# Patient Record
Sex: FEMALE | Race: WHITE
[De-identification: names, ages, dates, MRNs, and addresses within clinical notes are randomized per-mention and may not be internally consistent; named-entity substitution may affect disease eponyms.]

---

## 2019-04-04 ENCOUNTER — HOSPITAL ENCOUNTER (OUTPATIENT)
Dept: HOSPITAL 92 - SCSRAD | Age: 24
Discharge: HOME | End: 2019-04-04
Attending: NURSE PRACTITIONER
Payer: COMMERCIAL

## 2019-04-04 DIAGNOSIS — J40: Primary | ICD-10-CM

## 2019-04-04 PROCEDURE — 71046 X-RAY EXAM CHEST 2 VIEWS: CPT

## 2019-04-04 NOTE — RAD
TWO VIEW CHEST:

 

History: Bronchitis. 

 

FINDINGS: 

The lungs appear clear with no infiltrate identified. Heart and mediastinum unremarkable. Osseous str
uctures unremarkable. 

 

IMPRESSION:

Unremarkable chest. 

 

POS: [facility]

## 2020-01-03 ENCOUNTER — HOSPITAL ENCOUNTER (EMERGENCY)
Dept: HOSPITAL 92 - ERS | Age: 25
Discharge: HOME | End: 2020-01-03
Payer: COMMERCIAL

## 2020-01-03 DIAGNOSIS — Z87.442: ICD-10-CM

## 2020-01-03 DIAGNOSIS — N20.2: Primary | ICD-10-CM

## 2020-01-03 DIAGNOSIS — F17.210: ICD-10-CM

## 2020-01-03 LAB
ALBUMIN SERPL BCG-MCNC: 4.1 G/DL (ref 3.5–5)
ALP SERPL-CCNC: 103 U/L (ref 40–110)
ALT SERPL W P-5'-P-CCNC: 38 U/L (ref 8–55)
ANION GAP SERPL CALC-SCNC: 15 MMOL/L (ref 10–20)
AST SERPL-CCNC: 25 U/L (ref 5–34)
BASOPHILS # BLD AUTO: 0.1 THOU/UL (ref 0–0.2)
BASOPHILS NFR BLD AUTO: 0.4 % (ref 0–1)
BILIRUB SERPL-MCNC: 0.3 MG/DL (ref 0.2–1.2)
BUN SERPL-MCNC: 11 MG/DL (ref 7–18.7)
CALCIUM SERPL-MCNC: 9.9 MG/DL (ref 7.8–10.44)
CHLORIDE SERPL-SCNC: 102 MMOL/L (ref 98–107)
CO2 SERPL-SCNC: 21 MMOL/L (ref 22–29)
CREAT CL PREDICTED SERPL C-G-VRATE: 0 ML/MIN (ref 70–130)
EOSINOPHIL # BLD AUTO: 0.2 THOU/UL (ref 0–0.7)
EOSINOPHIL NFR BLD AUTO: 1.1 % (ref 0–10)
GLOBULIN SER CALC-MCNC: 4 G/DL (ref 2.4–3.5)
GLUCOSE SERPL-MCNC: 112 MG/DL (ref 70–105)
HGB BLD-MCNC: 14.2 G/DL (ref 12–16)
LIPASE SERPL-CCNC: 13 U/L (ref 8–78)
LYMPHOCYTES # BLD: 2.7 THOU/UL (ref 1.2–3.4)
LYMPHOCYTES NFR BLD AUTO: 15.6 % (ref 21–51)
MCH RBC QN AUTO: 26.9 PG (ref 27–31)
MCV RBC AUTO: 82.8 FL (ref 78–98)
MONOCYTES # BLD AUTO: 1.1 THOU/UL (ref 0.11–0.59)
MONOCYTES NFR BLD AUTO: 6.3 % (ref 0–10)
NEUTROPHILS # BLD AUTO: 13.2 THOU/UL (ref 1.4–6.5)
NEUTROPHILS NFR BLD AUTO: 76.6 % (ref 42–75)
PLATELET # BLD AUTO: 389 THOU/UL (ref 130–400)
POTASSIUM SERPL-SCNC: 4.8 MMOL/L (ref 3.5–5.1)
PREGS CONTROL BACKGROUND?: (no result)
PREGS CONTROL BAR APPEAR?: YES
PREGU CONTROL BACKGROUND?: (no result)
PREGU CONTROL BAR APPEAR?: YES
RBC # BLD AUTO: 5.29 MILL/UL (ref 4.2–5.4)
RBC UR QL AUTO: (no result) HPF (ref 0–3)
SODIUM SERPL-SCNC: 133 MMOL/L (ref 136–145)
WBC # BLD AUTO: 17.2 THOU/UL (ref 4.8–10.8)
WBC UR QL AUTO: (no result) HPF (ref 0–3)

## 2020-01-03 PROCEDURE — 83690 ASSAY OF LIPASE: CPT

## 2020-01-03 PROCEDURE — 81015 MICROSCOPIC EXAM OF URINE: CPT

## 2020-01-03 PROCEDURE — 87040 BLOOD CULTURE FOR BACTERIA: CPT

## 2020-01-03 PROCEDURE — 84703 CHORIONIC GONADOTROPIN ASSAY: CPT

## 2020-01-03 PROCEDURE — 81025 URINE PREGNANCY TEST: CPT

## 2020-01-03 PROCEDURE — 85025 COMPLETE CBC W/AUTO DIFF WBC: CPT

## 2020-01-03 PROCEDURE — 36415 COLL VENOUS BLD VENIPUNCTURE: CPT

## 2020-01-03 PROCEDURE — 74176 CT ABD & PELVIS W/O CONTRAST: CPT

## 2020-01-03 PROCEDURE — 80053 COMPREHEN METABOLIC PANEL: CPT

## 2020-01-03 PROCEDURE — 96374 THER/PROPH/DIAG INJ IV PUSH: CPT

## 2020-01-03 PROCEDURE — 83605 ASSAY OF LACTIC ACID: CPT

## 2020-01-03 PROCEDURE — 81003 URINALYSIS AUTO W/O SCOPE: CPT

## 2020-01-03 PROCEDURE — 96361 HYDRATE IV INFUSION ADD-ON: CPT

## 2020-01-03 NOTE — CT
CT ABDOMEN AND PELVIS WITHOUT CONTRAST STONE PROTOCOL:

 

HISTORY:

Flank pain.

 

COMPARISON:

CT abdomen and pelvis, stone protocol, 01/05/2019.

 

FINDINGS:

The lung bases are clear. No pericardial effusion.

 

Diffuse hepatic steatosis. Severe right hydroureteronephrosis due to a partially obstructing right di
stal ureteral calculus, measuring 3 x 4 mm, 1 cm from the ureterovesical junction. More proximal to t
his, near the adnexa, is another calculus, measuring 5 x 4 x 3 mm, approximately 5 cm from the ureter
ovesical junction.

 

The left collecting system is without hydroureteronephrosis. Numerous nonobstructive calculi within t
he left renal collecting system, measuring up to 3 mm, inferior pole left kidney. There are a few 2 t
o 3 mm calculi within the inferior pole right renal collecting system.

 

The aortoiliac contour is nonaneurysmal. No acute osseous abnormality.

 

The appendix is visualized and is normal.

 

IMPRESSION:

Partially obstructive calculi within the right ureter, as described, one 5 cm from the ureteropelvic 
junction and one 1 cm from the right ureterovesical junction.

 

POS: [facility]

## 2022-07-21 ENCOUNTER — HOSPITAL ENCOUNTER (EMERGENCY)
Dept: HOSPITAL 92 - CSHERS | Age: 27
Discharge: HOME | End: 2022-07-21
Payer: COMMERCIAL

## 2022-07-21 DIAGNOSIS — M54.50: Primary | ICD-10-CM

## 2022-07-21 PROCEDURE — 96372 THER/PROPH/DIAG INJ SC/IM: CPT

## 2022-07-21 PROCEDURE — 99283 EMERGENCY DEPT VISIT LOW MDM: CPT
